# Patient Record
Sex: FEMALE | Race: WHITE | NOT HISPANIC OR LATINO | Employment: OTHER | URBAN - METROPOLITAN AREA
[De-identification: names, ages, dates, MRNs, and addresses within clinical notes are randomized per-mention and may not be internally consistent; named-entity substitution may affect disease eponyms.]

---

## 2017-05-04 ENCOUNTER — GENERIC CONVERSION - ENCOUNTER (OUTPATIENT)
Dept: OTHER | Facility: OTHER | Age: 62
End: 2017-05-04

## 2017-05-04 ENCOUNTER — LAB CONVERSION - ENCOUNTER (OUTPATIENT)
Dept: OTHER | Facility: OTHER | Age: 62
End: 2017-05-04

## 2017-05-04 LAB — HBA1C MFR BLD HPLC: 5.4 % OF TOTAL HGB

## 2017-10-24 ENCOUNTER — ALLSCRIPTS OFFICE VISIT (OUTPATIENT)
Dept: OTHER | Facility: OTHER | Age: 62
End: 2017-10-24

## 2017-10-24 DIAGNOSIS — Z12.31 ENCOUNTER FOR SCREENING MAMMOGRAM FOR MALIGNANT NEOPLASM OF BREAST: ICD-10-CM

## 2017-11-07 ENCOUNTER — ALLSCRIPTS OFFICE VISIT (OUTPATIENT)
Dept: OTHER | Facility: OTHER | Age: 62
End: 2017-11-07

## 2017-11-08 NOTE — PROGRESS NOTES
Assessment  1  Shingles outbreak (053 9) (B02 9)    Plan  Shingles outbreak    · Famciclovir 500 MG Oral Tablet; TAKE 1 TABLET 3 TIMES DAILY FOR 7 DAYS   · Gabapentin 100 MG Oral Capsule; take 1 caps  q HS, may increase dose to 2 caps  in 2-3 days if no side effects, then increase to 3 caps  q HS in 3 days for  symptoms control   · Apply cold compresses 4 times a day for 20 minutes to help relieve pain or itching ;  Status:Complete;   Done: [id number]   · You may continue or resume your normal level of activity ; Status:Complete;   Done:  65FHD8893   · Seek Immediate Medical Attention if: You have signs of infection in or around the affected  area  Watch for:; Status:Complete;   Done: 46NUO7178    Discussion/Summary    Presents with herpes zoster outbreak start Famciclovir 500mg one tab TID x 7 days take Gabapentin as ordered for pain- SE reviewed  site daily with gentle soap and water, avoid any lotions/ creams to sitecompresses prn tight fitting clothing in area with any redness, drainage, warmth, increased pain, F/Cshe call her insurance regarding the Zostavax vaccine coverage- she should still get this vaccine  Possible side effects of new medications were reviewed with the patient/guardian today  The treatment plan was reviewed with the patient/guardian  The patient/guardian understands and agrees with the treatment plan     Self Referrals: No      Chief Complaint  Patient is here for a possible bug bite on her right side of her buttocks  she has had if for over a week now and it has not improved      Advance Directives  Advance Directive [facility]:   NO - Patient does not have an advance health care directive  History of Present Illness  HPI: Pt presents with her daughter today for an acute visit     she may have been bitten by a bug on her right lateral buttock, isn't necessarily sure what it isnoticed one week ago some discomfort in area, then noticed a few spots that were redis now scabbedleg and left buttock painful- worse when lying on right sidenoticed any drainage, but isn't really checking the site regularly doesn't feel warm fevers or chills remove a bug from the site not had the Zostavax vaccine yet      Review of Systems    Constitutional: no fever,-- not feeling poorly,-- no chills-- and-- not feeling tired  Cardiovascular: no chest pain,-- no intermittent leg claudication,-- no palpitations-- and-- no lower extremity edema  Respiratory: no shortness of breath,-- no cough,-- no wheezing-- and-- no shortness of breath during exertion  Gastrointestinal: no abdominal pain,-- no nausea,-- no constipation-- and-- no diarrhea  Musculoskeletal: no myalgias  Integumentary: as noted in HPI  Neurological: no numbness-- and-- no tingling  ROS reviewed  Active Problems  1  Encounter for screening mammogram for malignant neoplasm of breast (V76 12)   (Z12 31)   2  Impaired fasting glucose (790 21) (R73 01)   3  Mixed hyperlipidemia (272 2) (E78 2)   4  Need for influenza vaccination (V04 81) (Z23)   5  Osteopenia (733 90) (M85 80)   6  Post-menopausal (V49 81) (Z78 0)   7  Screening for cervical cancer (V76 2) (Z12 4)   8  Screening for colorectal cancer (V76 51) (Z12 11,Z12 12)   9  Screening for hypothyroidism (V77 0) (Z13 29)   10  Screening for osteoporosis (V82 81) (Z13 820)   11  Well adult exam (V70 0) (Z00 00)    Past Medical History  1  History of hypertension (V12 59) (Z86 79)   2  History of Overweight (278 02) (E66 3)  Active Problems And Past Medical History Reviewed: The active problems and past medical history were reviewed and updated today  Family History  Mother    1  Family history of hyperlipidemia (V18 19) (Z83 49)   2  Family history of hypertension (V17 49) (Z82 49)  Father    3   Family history of diabetes mellitus (V18 0) (Z83 3)    Social History   · Daily caffeine consumption, 1 serving a day   · Denies alcohol consumption (V49 89) (Z78 9)   · Never a smoker  The social history was reviewed and updated today  The social history was reviewed and is unchanged  Current Meds   1  Vitamin D 1000 UNIT Oral Tablet; TAKE 1 TABLET DAILY; Therapy: 21CJZ1667 to (Evaluate:92Oar1802); Last Rx:11Dco2512 Ordered    The medication list was reviewed and updated today  Allergies  1  No Known Drug Allergies    Vitals   Recorded: 15YPI4941 09:00AM   Temperature 97 7 F   Heart Rate 64   Respiration 20   Systolic 848   Diastolic 80   Height 5 ft 3 5 in   Weight 147 lb    BMI Calculated 25 63   BSA Calculated 1 71     Physical Exam    Constitutional   General appearance: No acute distress, well appearing and well nourished  Eyes   Conjunctiva and lids: No swelling, erythema or discharge  Pupils and irises: Equal, round and reactive to light  Pulmonary   Respiratory effort: No increased work of breathing or signs of respiratory distress  Auscultation of lungs: Clear to auscultation  Cardiovascular   Auscultation of heart: Normal rate and rhythm, normal S1 and S2, without murmurs  Examination of extremities for edema and/or varicosities: Normal     Lymphatic   Palpation of lymph nodes in neck: No lymphadenopathy  Musculoskeletal   Gait and station: Normal     Skin   Skin and subcutaneous tissue: Abnormal  -- Erythematous vesicles, mostly crusted on right lower, lateral buttock  No warmth or signs of infection  +slightly tender to palpation  No drainage  Psychiatric   Orientation to person, place, and time: Normal     Mood and affect: Normal          Attending Note  Collaborating Physician Note: Collaborating Note: I did not interview and examine the patient-- and-- I agree with the Advanced Practitioner note  Future Appointments    Date/Time Provider Specialty Site   11/14/2018 09:00 AM YONAS Retana   Family Medicine [facility]     Signatures   Electronically signed by : MILO Mueller; Nov 7 2017  9:43AM EST (Author)    Electronically signed by :  Stew Harry MD; Nov 7 2017 11:32AM EST                       (Author)

## 2017-12-12 ENCOUNTER — GENERIC CONVERSION - ENCOUNTER (OUTPATIENT)
Dept: OTHER | Facility: OTHER | Age: 62
End: 2017-12-12

## 2017-12-12 LAB
BASOPHILS # BLD AUTO: 0.8 %
BASOPHILS # BLD AUTO: 42 CELLS/UL (ref 0–200)
CHOLEST SERPL-MCNC: 281 MG/DL
CHOLEST/HDLC SERPL: 2.7 (CALC)
DEPRECATED RDW RBC AUTO: 13.1 % (ref 11–15)
EOSINOPHIL # BLD AUTO: 249 CELLS/UL (ref 15–500)
EOSINOPHIL # BLD AUTO: 4.7 %
EST. AVERAGE GLUCOSE BLD GHB EST-MCNC: 105 (CALC)
EST. AVERAGE GLUCOSE BLD GHB EST-MCNC: 5.8 (CALC)
HBA1C MFR BLD HPLC: 5.3 % OF TOTAL HGB
HCT VFR BLD AUTO: 41.5 % (ref 35–45)
HDLC SERPL-MCNC: 103 MG/DL
HGB BLD-MCNC: 13.5 G/DL (ref 11.7–15.5)
LDL CHOLESTEROL (HISTORICAL): 154 MG/DL (CALC)
LYMPHOCYTES # BLD AUTO: 1648 CELLS/UL (ref 850–3900)
LYMPHOCYTES # BLD AUTO: 31.1 %
MCH RBC QN AUTO: 28.8 PG (ref 27–33)
MCHC RBC AUTO-ENTMCNC: 32.5 G/DL (ref 32–36)
MCV RBC AUTO: 88.5 FL (ref 80–100)
MONOCYTES # BLD AUTO: 360 CELLS/UL (ref 200–950)
MONOCYTES (HISTORICAL): 6.8 %
NEUTROPHILS # BLD AUTO: 3000 CELLS/UL (ref 1500–7800)
NEUTROPHILS # BLD AUTO: 56.6 %
NON-HDL-CHOL (CHOL-HDL) (HISTORICAL): 178 MG/DL (CALC)
PLATELET # BLD AUTO: 262 THOUSAND/UL (ref 140–400)
PMV BLD AUTO: 11.4 FL (ref 7.5–12.5)
RBC # BLD AUTO: 4.69 MILLION/UL (ref 3.8–5.1)
TRIGL SERPL-MCNC: 118 MG/DL
TSH SERPL DL<=0.05 MIU/L-ACNC: 1.83 MIU/L (ref 0.4–4.5)
WBC # BLD AUTO: 5.3 THOUSAND/UL (ref 3.8–10.8)

## 2018-01-13 VITALS
HEIGHT: 64 IN | RESPIRATION RATE: 20 BRPM | BODY MASS INDEX: 25.1 KG/M2 | WEIGHT: 147 LBS | DIASTOLIC BLOOD PRESSURE: 80 MMHG | HEART RATE: 64 BPM | TEMPERATURE: 97.7 F | SYSTOLIC BLOOD PRESSURE: 140 MMHG

## 2018-01-15 VITALS
RESPIRATION RATE: 16 BRPM | HEIGHT: 64 IN | SYSTOLIC BLOOD PRESSURE: 136 MMHG | BODY MASS INDEX: 25.51 KG/M2 | DIASTOLIC BLOOD PRESSURE: 70 MMHG | TEMPERATURE: 97.9 F | HEART RATE: 68 BPM | WEIGHT: 149.4 LBS

## 2018-01-15 NOTE — PROGRESS NOTES
Assessment    1  Mixed hyperlipidemia (272 2) (E78 2)   2  Impaired fasting glucose (790 21) (R73 01)   3  Osteopenia (733 90) (M85 80)   4  Need for influenza vaccination (V04 81) (Z23)   5  Well adult exam (V70 0) (Z00 00)    Plan  Encounter for screening mammogram for malignant neoplasm of breast    · * MAMMO SCREENING BILATERAL W CAD; Status:Hold For - Scheduling; Requested  for:2017;   Impaired fasting glucose    · (Q) HEMOGLOBIN A1c WITH eAG; Status:Active; Requested DOT:67NQH3358;   Impaired fasting glucose, Mixed hyperlipidemia, Osteopenia    · Follow-up visit in 1 year Evaluation and Treatment  Follow-up  Status: Hold For -  Scheduling  Requested for: 23OVS4812   · (1) LIPID PANEL, FASTING; Status:Active; Requested BOW:38NTR2208;    · (Q) TSH, 3RD GENERATION; Status:Active; Requested SJO:69CIQ8844;   Need for influenza vaccination    · Fluzone Quadrivalent Intramuscular Suspension  Osteopenia    · (Q) CBC (INCLUDES DIFF/PLT) (REFL); Status:Active; Requested YAM:64BUA0105;   Screening for colorectal cancer    · COLONOSCOPY; Status:Active; Requested [id number];     Discussion/Summary  health maintenance visit Currently, she eats a healthy diet and has an adequate exercise regimen  the risks and benefits of cervical cancer screening were discussed the patient declines cervical cancer screening Breast cancer screening: the risks and benefits of breast cancer screening were discussed, monthly self breast exam was advised and mammogram has been ordered  Colorectal cancer screening: the risks and benefits of colorectal cancer screening were discussed and colonoscopy has been ordered  Osteoporosis screening: the risks and benefits of osteoporosis screening were discussed and bone mineral density testing is current  Screening lab work includes hemoglobin, glucose, lipid profile and thyroid function testing  The risks and benefits of immunizations were discussed and Fluzone administered today   She was advised to be evaluated by an ophthalmologist  Advice and education were given regarding nutrition, aerobic exercise, weight bearing exercise, calcium supplements, vitamin D supplements, cardiovascular risk reduction, sunscreen use, self skin examination and seat belt use  Patient discussion: discussed with the patient, discussed with the patient's family  1 Well adult PE - recommended to schedule mammogram, colonoscopy  Fluzone administered today  Recommended Zostavax, Tdap vaccination  Patient's daughter will check with insurance regarding coverage  2 Hyperlipidemia /IFG -follow a low-cholesterol, low carb diet, regular exercise  Check CMP, lipid panel, TSH  3 Osteopenia -encouraged weightbearing exercise, sufficient dietary calcium intake  Continue Vit D 1000 IU daily  Recheck DEXA scan next year  Schedule follow-up office visit in 1 year or call sooner with any acute problems  Chief Complaint  Patient here for 1 year preventative physical exam       History of Present Illness  HM, Adult Female: The patient is being seen for a health maintenance evaluation  General Health: The patient's health since the last visit is described as good  She has regular dental visits  She complains of vision problems  Vision care includes wearing reading glasses  She denies hearing loss  Immunizations status: not up to date  Lifestyle:  She consumes a diverse and healthy diet  She does not have any weight concerns  She exercises regularly  She does not use tobacco  She denies alcohol use  She denies drug use  Reproductive health: the patient is postmenopausal    Screening: Cervical cancer screening includes uncertain timing of her last pap smear  Breast cancer screening includes a mammogram performed over 10 years ago  Colorectal cancer screening includes no previous colonoscopy  Metabolic screening includes lipid profile performed 5/16, glucose screening performed 5/17 and DEXA performed 5/16  HPI: Patient presents to the office for awell adult PE accompanied by her daughter  Patient was last seen by Sim Rider in November 2016  PMHx: Hyperlipidemia, IFG, osteopenia  Patient offers no complaints  She takes Vitamin D 1000 IU daily  Last blood test done in May 2017  Fasting blood sugar 89, Hemoglobin A1c 5 4  DEXA scan done in 5/16 showed osteopenia  Patient has not had mammogram for over 10 years  Never had colonoscopy  Review of Systems    Constitutional: no fever, no chills and not feeling tired  Weight has been stable  Eyes: no eye pain, no dryness of the eyes, eyes not red, no purulent discharge from the eyes and no itching of the eyes  No visual disturbances  ENT: no earache, no nosebleeds, no sore throat, no hearing loss, no nasal discharge and no hoarseness  Cardiovascular: no chest pain, no intermittent leg claudication, no palpitations and no lower extremity edema  Respiratory: no shortness of breath, no cough, no wheezing and no shortness of breath during exertion  Gastrointestinal: no abdominal pain, no nausea, no vomiting, no constipation, no diarrhea and no blood in stools  Genitourinary: no dysuria, no pelvic pain, no vaginal discharge, no incontinence and no unexplained vaginal bleeding  Musculoskeletal: no arthralgias, no joint swelling, no myalgias and no joint stiffness  Integumentary: no rashes, no itching, no skin lesions and no skin wound  Neurological: no headache, no numbness, no tingling, no dizziness, no fainting and no difficulty walking  Psychiatric: no anxiety, no sleep disturbances and no depression  Endocrine: no muscle weakness and no hot flashes  Hematologic/Lymphatic: No complaints of swollen glands, no swollen glands in the neck, does not bleed easily, does not bruise easily  Active Problems    1  Encounter for screening mammogram for malignant neoplasm of breast (V76 12)   (Z12 31)   2   Impaired fasting glucose (790 21) (R73 01)   3  Mixed hyperlipidemia (272 2) (E78 2)   4  Need for influenza vaccination (V04 81) (Z23)   5  Osteopenia (733 90) (M85 80)   6  Post-menopausal (V49 81) (Z78 0)   7  Screening for cervical cancer (V76 2) (Z12 4)   8  Screening for colorectal cancer (V76 51) (Z12 11,Z12 12)   9  Screening for hypothyroidism (V77 0) (Z13 29)   10  Screening for osteoporosis (V82 81) (Z13 820)    Past Medical History    · History of hypertension (V12 59) (Z86 79)   · History of Overweight (278 02) (E66 3)    Family History  Mother    · Family history of hyperlipidemia (V18 19) (Z83 49)   · Family history of hypertension (V17 49) (Z82 49)  Father    · Family history of diabetes mellitus (V18 0) (Z83 3)    Social History    · Daily caffeine consumption, 1 serving a day   · Denies alcohol consumption (V49 89) (Z78 9)   · Never a smoker    Current Meds   1  Vitamin D 1000 UNIT Oral Tablet; TAKE 1 TABLET DAILY; Therapy: 52UGP7918 to (Evaluate:73Bpw3430); Last Rx:[id number] Ordered    Allergies    1  No Known Drug Allergies    Vitals   Recorded: 67OQA5591 08:50AM Recorded: 17YFI3204 08:22AM   Temperature  97 9 F, Tympanic   Heart Rate  68, L Radial   Respiration  16   Systolic 119 419, LUE   Diastolic 70 60, LUE   Height  5 ft 3 5 in   Weight  149 lb 6 4 oz   BMI Calculated  26 05   BSA Calculated  1 72   Pain Scale  0     Physical Exam    Constitutional   General appearance: No acute distress, well appearing and well nourished  Head and Face   Head and face: Normal     Eyes   Conjunctiva and lids: No swelling, erythema or discharge  Pupils and irises: Equal, round, reactive to light  Ears, Nose, Mouth, and Throat   External inspection of ears and nose: Normal     Otoscopic examination: Tympanic membranes translucent with normal light reflex  Canals patent without erythema  Hearing: Normal     Nasal mucosa, septum, and turbinates: Normal without edema or erythema      Lips, teeth, and gums: Normal, good dentition  Oropharynx: Normal with no erythema, edema, exudate or lesions  Neck   Neck: Supple, symmetric, trachea midline, no masses  Pulmonary   Respiratory effort: No increased work of breathing or signs of respiratory distress  Auscultation of lungs: Clear to auscultation  Cardiovascular   Auscultation of heart: Normal rate and rhythm, normal S1 and S2, no murmurs  Carotid pulses: 2+ bilaterally  no carotid bruits  Abdominal aorta: Normal   no abdominal bruits  Examination of extremities for edema and/or varicosities: Normal     Abdomen   Abdomen: Non-tender, no masses  Liver and spleen: No hepatomegaly or splenomegaly  Lymphatic   Palpation of lymph nodes in neck: No lymphadenopathy  Musculoskeletal   Gait and station: Normal     Digits and nails: Normal without clubbing or cyanosis  Joints, bones, and muscles: Normal     Range of motion: Normal     Skin   Skin and subcutaneous tissue: Normal without rashes or lesions  Neurologic   Cranial nerves: Cranial nerves II-XII intact  Sensation: No sensory loss  Psychiatric   Judgment and insight: Normal     Recent and remote memory: Intact      Mood and affect: Normal        Results/Data  (1) CBC/PLT/DIFF [id number] 07:36AM Lila Boor     Test Name Result Flag Reference   WHITE BLOOD CELL COUNT 4 6 Thousand/uL  3 8-10 8   RED BLOOD CELL COUNT 4 61 Million/uL  3 80-5 10   HEMOGLOBIN 13 2 g/dL  11 7-15 5   HEMATOCRIT 40 4 %  35 0-45 0   MCV 87 7 fL  80 0-100 0   MCH 28 6 pg  27 0-33 0   MCHC 32 6 g/dL  32 0-36 0   RDW 13 7 %  11 0-15 0   PLATELET COUNT 375 Thousand/uL  140-400   ABSOLUTE NEUTROPHILS 2548 cells/uL  9241-0763   ABSOLUTE LYMPHOCYTES 1518 cells/uL  850-3900   ABSOLUTE MONOCYTES 248 cells/uL  200-950   ABSOLUTE EOSINOPHILS 281 cells/uL     ABSOLUTE BASOPHILS 5 cells/uL  0-200   NEUTROPHILS 55 4 %     LYMPHOCYTES 33 0 %     MONOCYTES 5 4 %     EOSINOPHILS 6 1 %     BASOPHILS 0 1 %     MPV 9 4 fL 7  5-12 5     (1) COMPREHENSIVE METABOLIC PANEL [id number] 86:90RL Muñoz Poag     Test Name Result Flag Reference   GLUCOSE 89 mg/dL  65-99   Fasting reference interval   UREA NITROGEN (BUN) 12 mg/dL  7-25   CREATININE 0 76 mg/dL  0 50-0 99   For patients >52years of age, the reference limit  for Creatinine is approximately 13% higher for people  identified as -American  eGFR NON-AFR  AMERICAN 83 mL/min/1 73m2  > OR = 60   eGFR AFRICAN AMERICAN 97 mL/min/1 73m2  > OR = 60   BUN/CREATININE RATIO   7-38   NOT APPLICABLE (calc)   SODIUM 140 mmol/L  135-146   POTASSIUM 4 2 mmol/L  3 5-5 3   CHLORIDE 102 mmol/L     CARBON DIOXIDE 29 mmol/L  20-31   CALCIUM 9 4 mg/dL  8 6-10 4   PROTEIN, TOTAL 6 9 g/dL  6 1-8 1   ALBUMIN 4 1 g/dL  3 6-5 1   GLOBULIN 2 8 g/dL (calc)  1 9-3 7   ALBUMIN/GLOBULIN RATIO 1 5 (calc)  1 0-2 5   BILIRUBIN, TOTAL 0 5 mg/dL  0 2-1 2   ALKALINE PHOSPHATASE 54 U/L     AST 13 U/L  10-35   ALT 9 U/L  6-29     (Q) HEMOGLOBIN A1c [id number] 07:36AM Sally Ferguson   REPORT COMMENT:  FASTING:YES     Test Name Result Flag Reference   HEMOGLOBIN A1c 5 4 % of total Hgb  <5 7   For the purpose of screening for the presence of  diabetes:     <5 7%       Consistent with the absence of diabetes  5 7-6 4%    Consistent with increased risk for diabetes              (prediabetes)  > or =6 5%  Consistent with diabetes     This assay result is consistent with a decreased risk  of diabetes  Currently, no consensus exists regarding use of  hemoglobin A1c for diagnosis of diabetes in children  According to American Diabetes Association (ADA)  guidelines, hemoglobin A1c <7 0% represents optimal  control in non-pregnant diabetic patients  Different  metrics may apply to specific patient populations  Standards of Medical Care in Diabetes(ADA)  Health Management  Screening for cervical cancer   (1) THIN PREP PAP WITH IMAGING; every 3 years;  Next Due: 85BZG7518; Overdue    Signatures   Electronically signed by : YONAS Lara ; Oct 24 2017  8:59AM EST                       (Author)

## 2018-01-16 NOTE — RESULT NOTES
Message   Please let the patient know I reviewed her blood work results  Her fasting glucose was normal at 90  This is better than her previous fasting glucose which was at 100  Her A1C was at 5 7% which was borderline for pre-diabetes  She should follow a low carb diet, try to exercise 30 minutes 5x per week  Her electrolytes, kidney function and liver enzymes were WNL  I will order blood work for her to get done about 1-2 weeks prior to her next appointmnet in this upcoming May  These labs will need to be fasting  Please mail this to her  Thank you  Verified Results  (1) CBC/PLT/DIFF [id number] 08:22AM Eric Wing     Test Name Result Flag Reference   WHITE BLOOD CELL COUNT 5 3 Thousand/uL  3 8-10 8   RED BLOOD CELL COUNT 4 81 Million/uL  3 80-5 10   HEMOGLOBIN 14 0 g/dL  11 7-15 5   HEMATOCRIT 42 1 %  35 0-45 0   MCV 87 6 fL  80 0-100 0   MCH 29 1 pg  27 0-33 0   MCHC 33 2 g/dL  32 0-36 0   RDW 13 8 %  11 0-15 0   PLATELET COUNT 345 Thousand/uL  140-400   MPV 9 7 fL  7 5-11 5   ABSOLUTE NEUTROPHILS 3201 cells/uL  2496-3002   ABSOLUTE LYMPHOCYTES 1505 cells/uL  850-3900   ABSOLUTE MONOCYTES 339 cells/uL  200-950   ABSOLUTE EOSINOPHILS 244 cells/uL     ABSOLUTE BASOPHILS 11 cells/uL  0-200   NEUTROPHILS 60 4 %     LYMPHOCYTES 28 4 %     MONOCYTES 6 4 %     EOSINOPHILS 4 6 %     BASOPHILS 0 2 %       (1) COMPREHENSIVE METABOLIC PANEL [id number] 25:29NH Eric Wing     Test Name Result Flag Reference   GLUCOSE 90 mg/dL  65-99   Fasting reference interval   UREA NITROGEN (BUN) 14 mg/dL  7-25   CREATININE 0 78 mg/dL  0 50-0 99   For patients >52years of age, the reference limit  for Creatinine is approximately 13% higher for people  identified as -American  eGFR NON-AFR   AMERICAN 81 mL/min/1 73m2  > OR = 60   eGFR AFRICAN AMERICAN 94 mL/min/1 73m2  > OR = 60   BUN/CREATININE RATIO   2-09   NOT APPLICABLE (calc)   SODIUM 138 mmol/L  135-146 POTASSIUM 4 3 mmol/L  3 5-5 3   CHLORIDE 101 mmol/L     CARBON DIOXIDE 28 mmol/L  20-31   CALCIUM 9 6 mg/dL  8 6-10 4   PROTEIN, TOTAL 7 1 g/dL  6 1-8 1   ALBUMIN 4 4 g/dL  3 6-5 1   GLOBULIN 2 7 g/dL (calc)  1 9-3 7   ALBUMIN/GLOBULIN RATIO 1 6 (calc)  1 0-2 5   BILIRUBIN, TOTAL 0 7 mg/dL  0 2-1 2   ALKALINE PHOSPHATASE 54 U/L     AST 13 U/L  10-35   ALT 13 U/L  6-29     (Q) HEMOGLOBIN A1c [id number] 08:22AM Ghada Loco   REPORT COMMENT:  FASTING:YES     Test Name Result Flag Reference   HEMOGLOBIN A1c 5 7 % of total Hgb H <5 7   According to ADA guidelines, hemoglobin A1c <7 0%  represents optimal control in non-pregnant diabetic  patients  Different metrics may apply to specific  patient populations  Standards of Medical Care in    Diabetes Care  2013;36:s11-s66     For the purpose of screening for the presence of  diabetes  <5 7%       Consistent with the absence of diabetes  5 7-6 4%    Consistent with increased risk for diabetes              (prediabetes)  >or=6 5%    Consistent with diabetes     This assay result is consistent with an increased risk  of diabetes  Currently, no consensus exists for use of hemoglobin  A1c for diagnosis of diabetes for children  Plan  Impaired fasting glucose    · (1) HEMOGLOBIN A1C; Status:Hold For - Exact Date; Requested for:Approx [id number];   Impaired fasting glucose, Mixed hyperlipidemia, Osteopenia, Overweight    · (1) CBC/PLT/DIFF; Status:Hold For - Exact Date; Requested for:Approx [id number];   Impaired fasting glucose, Mixed hyperlipidemia, Osteopenia, Overweight,  Post-menopausal    · (1) COMPREHENSIVE METABOLIC PANEL; Status:Hold For - Exact Date; Requested  for:Approx [id number];   Mixed hyperlipidemia    · (1) TSH WITH FT4 REFLEX; Status:Hold For - Exact Date; Requested for:Approx  [id number];   Mixed hyperlipidemia, Overweight    · (1) LIPID PANEL FASTING W DIRECT LDL REFLEX; Status:Hold For - Exact Date;   Requested for:Approx [id number];

## 2018-01-18 NOTE — RESULT NOTES
Message   Please let the patient know I reviewed her lab work results  TSH (thyroid) WNL  Blood counts (hemoglobin, WBC, platelets, etc ) WNL  Electrolytes, kidney function and liver function WNL  Fasting glucose was very slighlty elevated at 100- should be <100  Try to follow a low carb diet  Total cholesterol slightly elevated at 239 (should be <200)  HDL (good cholesterol) was great at 95  Triglycerides good at 130  LDL (bad cholesterol) at 118  Try to follow a low fat/ low cholesterol diet and exercise 30 minutes 5 x per week  Thank you  Verified Results  (1) CBC/PLT/DIFF [id number] 07:37AM [facility]     Test Name Result Flag Reference   WHITE BLOOD CELL COUNT 5 3 Thousand/uL  3 8-10 8   RED BLOOD CELL COUNT 4 75 Million/uL  3 80-5 10   HEMOGLOBIN 13 6 g/dL  11 7-15 5   HEMATOCRIT 41 8 %  35 0-45 0   MCV 87 9 fL  80 0-100 0   MCH 28 6 pg  27 0-33 0   MCHC 32 5 g/dL  32 0-36 0   RDW 13 8 %  11 0-15 0   PLATELET COUNT 656 Thousand/uL  140-400   MPV 9 9 fL  7 5-11 5   ABSOLUTE NEUTROPHILS 3185 cells/uL  9477-1171   ABSOLUTE LYMPHOCYTES 1542 cells/uL  850-3900   ABSOLUTE MONOCYTES 313 cells/uL  200-950   ABSOLUTE EOSINOPHILS 244 cells/uL     ABSOLUTE BASOPHILS 16 cells/uL  0-200   NEUTROPHILS 60 1 %     LYMPHOCYTES 29 1 %     MONOCYTES 5 9 %     EOSINOPHILS 4 6 %     BASOPHILS 0 3 %       (1) COMPREHENSIVE METABOLIC PANEL [id number] 89:65UI [facility]     Test Name Result Flag Reference   GLUCOSE 100 mg/dL H 65-99   Fasting reference interval   UREA NITROGEN (BUN) 15 mg/dL  7-25   CREATININE 0 71 mg/dL  0 50-0 99   For patients >52years of age, the reference limit  for Creatinine is approximately 13% higher for people  identified as -American  eGFR NON-AFR   AMERICAN 91 mL/min/1 73m2  > OR = 60   eGFR AFRICAN AMERICAN 106 mL/min/1 73m2  > OR = 60   BUN/CREATININE RATIO   0-49   NOT APPLICABLE (calc)   SODIUM 139 mmol/L  135-146   POTASSIUM 4 3 mmol/L  3 5-5 3   CHLORIDE 103 mmol/L     CARBON DIOXIDE 27 mmol/L  19-30   CALCIUM 9 3 mg/dL  8 6-10 4   PROTEIN, TOTAL 7 1 g/dL  6 1-8 1   ALBUMIN 4 5 g/dL  3 6-5 1   GLOBULIN 2 6 g/dL (calc)  1 9-3 7   ALBUMIN/GLOBULIN RATIO 1 7 (calc)  1 0-2 5   BILIRUBIN, TOTAL 0 6 mg/dL  0 2-1 2   ALKALINE PHOSPHATASE 54 U/L     AST 13 U/L  10-35   ALT 10 U/L  6-29     (Q) LIPID PANEL WITH REFLEX TO DIRECT LDL [id number] 07:37AM Dydra Cost     Test Name Result Flag Reference   CHOLESTEROL, TOTAL 239 mg/dL H 125-200   HDL CHOLESTEROL 95 mg/dL  > OR = 46   TRIGLICERIDES 042 mg/dL  <150   LDL-CHOLESTEROL 118 mg/dL (calc)  <130   Desirable range <100 mg/dL for patients with CHD or  diabetes and <70 mg/dL for diabetic patients with  known heart disease  CHOL/HDLC RATIO 2 5 (calc)  < OR = 5 0   NON HDL CHOLESTEROL 144 mg/dL (calc)     Target for non-HDL cholesterol is 30 mg/dL higher than   LDL cholesterol target       (Q) TSH, 3RD GENERATION W/REFLEX TO FT4 [id number] 07:37AM [facility] Cost   REPORT COMMENT:  FASTING:YES     Test Name Result Flag Reference   TSH W/REFLEX TO FT4 1 47 mIU/L  0 40-4 50

## 2018-01-18 NOTE — RESULT NOTES
Message   Dexa showed low bone density  Pt should take calcium 1200mg daily , vitD 1000IU daily and walk everyday  Verified Results  * DXA BONE DENSITY SPINE HIP AND PELVIS [id number] 10:54AM [facility] Number: [id number]     Test Name Result Flag Reference   DXA BONE DENSITY SPINE HIP AND PELVIS (Report)     CENTRAL DXA SCAN     CLINICAL HISTORY:  56year old post-menopausal  female risk factors include postmenopausal, estrogen deficiency  TECHNIQUE: Bone densitometry was performed using a [employer] [place] C bone densitometer  Regions of interest appear properly placed  There are no obvious fractures or other confounding variables which could limit the study  L1 is excluded from evaluation   due to the presence of degenerative or osteoarthritic changes  COMPARISON: Baseline     RESULTS:    LUMBAR SPINE: L2-L4:   BMD 0 865 gm/cm2   T-score below normal, -1 9   Z-score -0 3     LEFT TOTAL HIP:   BMD 0 904 gm/cm2   T-score normal, -0 3   Z-score 0 8     LEFT FEMORAL NECK:   BMD 0 798 gm/cm2   T-score normal, -0 5   Z-score +1 0     The Frax score in this patient identifies the risk of a major osteoporotic fracture in the next 10 years at 7 0% and a hip fracture risk of 0 3%, below treatment thresholds     ASSESSMENT:   1  Based on the WHO classification, this study identifies moderate spine osteopenia and the patient is considered at current low risk for fracture  2  A daily intake of calcium of at least 1200 mg and vitamin D, 800-1000 IU, as well as weight bearing and muscle strengthening exercise, fall prevention and avoidance of tobacco and excessive alcohol intake as basic preventive measures are recommended  3  Repeat DXA scan in 24-36 months as clinically indicated        WHO CLASSIFICATION:   Normal (a T-score of -1 0 or higher)   Low bone mineral density (a T-score of less than -1 0 but higher than -2 5)   Osteoporosis (a T-score of -2 5 or less)   Severe osteoporosis (a T-score of -2 5 or less with a fragility fracture)     Thank you for allowing us the opportunity to participate in your patient care  The expanded DEXA report will no longer be arriving in your mail  If you desire to view the full report please contact [address] [place] [address] or access the PACS system          Workstation performed: [id number]

## 2018-01-18 NOTE — RESULT NOTES
Verified Results  (1) CBC/PLT/DIFF [id number] 07:36AM Bernarda Tolentino     Test Name Result Flag Reference   WHITE BLOOD CELL COUNT 4 6 Thousand/uL  3 8-10 8   RED BLOOD CELL COUNT 4 61 Million/uL  3 80-5 10   HEMOGLOBIN 13 2 g/dL  11 7-15 5   HEMATOCRIT 40 4 %  35 0-45 0   MCV 87 7 fL  80 0-100 0   MCH 28 6 pg  27 0-33 0   MCHC 32 6 g/dL  32 0-36 0   RDW 13 7 %  11 0-15 0   PLATELET COUNT 793 Thousand/uL  140-400   ABSOLUTE NEUTROPHILS 2548 cells/uL  5149-3640   ABSOLUTE LYMPHOCYTES 1518 cells/uL  850-3900   ABSOLUTE MONOCYTES 248 cells/uL  200-950   ABSOLUTE EOSINOPHILS 281 cells/uL     ABSOLUTE BASOPHILS 5 cells/uL  0-200   NEUTROPHILS 55 4 %     LYMPHOCYTES 33 0 %     MONOCYTES 5 4 %     EOSINOPHILS 6 1 %     BASOPHILS 0 1 %     MPV 9 4 fL  7 5-12 5     (1) COMPREHENSIVE METABOLIC PANEL [id number] 96:84GX Bernarda Tolentino     Test Name Result Flag Reference   GLUCOSE 89 mg/dL  65-99   Fasting reference interval   UREA NITROGEN (BUN) 12 mg/dL  7-25   CREATININE 0 76 mg/dL  0 50-0 99   For patients >52years of age, the reference limit  for Creatinine is approximately 13% higher for people  identified as -American  eGFR NON-AFR   AMERICAN 83 mL/min/1 73m2  > OR = 60   eGFR AFRICAN AMERICAN 97 mL/min/1 73m2  > OR = 60   BUN/CREATININE RATIO   9-07   NOT APPLICABLE (calc)   SODIUM 140 mmol/L  135-146   POTASSIUM 4 2 mmol/L  3 5-5 3   CHLORIDE 102 mmol/L     CARBON DIOXIDE 29 mmol/L  20-31   CALCIUM 9 4 mg/dL  8 6-10 4   PROTEIN, TOTAL 6 9 g/dL  6 1-8 1   ALBUMIN 4 1 g/dL  3 6-5 1   GLOBULIN 2 8 g/dL (calc)  1 9-3 7   ALBUMIN/GLOBULIN RATIO 1 5 (calc)  1 0-2 5   BILIRUBIN, TOTAL 0 5 mg/dL  0 2-1 2   ALKALINE PHOSPHATASE 54 U/L     AST 13 U/L  10-35   ALT 9 U/L  6-29     (Q) HEMOGLOBIN A1c [id number] 07:36AM Sally Ferguson   REPORT COMMENT:  FASTING:YES     Test Name Result Flag Reference   HEMOGLOBIN A1c 5 4 % of total Hgb  <5 7   For the purpose of screening for the presence of  diabetes:     <5 7%       Consistent with the absence of diabetes  5 7-6 4%    Consistent with increased risk for diabetes              (prediabetes)  > or =6 5%  Consistent with diabetes     This assay result is consistent with a decreased risk  of diabetes  Currently, no consensus exists regarding use of  hemoglobin A1c for diagnosis of diabetes in children  According to American Diabetes Association (ADA)  guidelines, hemoglobin A1c <7 0% represents optimal  control in non-pregnant diabetic patients  Different  metrics may apply to specific patient populations  Standards of Medical Care in Diabetes(ADA)

## 2018-01-23 NOTE — RESULT NOTES
Verified Results  (Q) CBC (INCLUDES DIFF/PLT) (REFL) [id number] 08:49AM Whitney Ya     Test Name Result Flag Reference   WHITE BLOOD CELL COUNT 5 3 Thousand/uL  3 8-10 8   RED BLOOD CELL COUNT 4 69 Million/uL  3 80-5 10   HEMOGLOBIN 13 5 g/dL  11 7-15 5   HEMATOCRIT 41 5 %  35 0-45 0   MCV 88 5 fL  80 0-100 0   MCH 28 8 pg  27 0-33 0   MCHC 32 5 g/dL  32 0-36 0   RDW 13 1 %  11 0-15 0   PLATELET COUNT 865 Thousand/uL  140-400   MPV 11 4 fL  7 5-12 5   ABSOLUTE NEUTROPHILS 3000 cells/uL  9519-1219   ABSOLUTE LYMPHOCYTES 1648 cells/uL  850-3900   ABSOLUTE MONOCYTES 360 cells/uL  200-950   ABSOLUTE EOSINOPHILS 249 cells/uL     ABSOLUTE BASOPHILS 42 cells/uL  0-200   NEUTROPHILS 56 6 %     LYMPHOCYTES 31 1 %     MONOCYTES 6 8 %     EOSINOPHILS 4 7 %     BASOPHILS 0 8 %       (Q) TSH, 3RD GENERATION [id number] 08:49AM Whitney Ya     Test Name Result Flag Reference   TSH 1 83 mIU/L  0 40-4 50     (1) LIPID PANEL, FASTING [id number] 08:49AM Whitney Ya     Test Name Result Flag Reference   CHOLESTEROL, TOTAL 281 mg/dL H <200   HDL CHOLESTEROL 103 mg/dL  >71   TRIGLICERIDES 646 mg/dL  <150   LDL-CHOLESTEROL 154 mg/dL (calc) H    Reference range: <100     Desirable range <100 mg/dL for patients with CHD or  diabetes and <70 mg/dL for diabetic patients with  known heart disease  LDL-C is now calculated using the Bong-Green   calculation, which is a validated novel method providing   better accuracy than the Friedewald equation in the   estimation of LDL-C  Violetta Levin  8016;305(31): 3898-3861   (http://[employer]/faq/WOX222)   CHOL/HDLC RATIO 2 7 (calc)  <5 0   NON HDL CHOLESTEROL 178 mg/dL (calc) H <130   For patients with diabetes plus 1 major ASCVD risk   factor, treating to a non-HDL-C goal of <100 mg/dL   (LDL-C of <70 mg/dL) is considered a therapeutic   option       (Q) HEMOGLOBIN A1c WITH eAG [id number] 08:49AM Whitney Ya   REPORT COMMENT:  FASTING:YES     Test Name Result Flag Reference   HEMOGLOBIN A1c 5 3 % of total Hgb  <5 7   For the purpose of screening for the presence of  diabetes:     <5 7%       Consistent with the absence of diabetes  5 7-6 4%    Consistent with increased risk for diabetes              (prediabetes)  > or =6 5%  Consistent with diabetes     This assay result is consistent with a decreased risk  of diabetes  Currently, no consensus exists regarding use of  hemoglobin A1c for diagnosis of diabetes in children  According to American Diabetes Association (ADA)  guidelines, hemoglobin A1c <7 0% represents optimal  control in non-pregnant diabetic patients  Different  metrics may apply to specific patient populations  Standards of Medical Care in Diabetes(ADA)  eAG (mg/dL) 105 (calc)     eAG (mmol/L) 5 8 (calc)         Plan  Impaired fasting glucose, Mixed hyperlipidemia    · (1) LIPID PANEL, FASTING; Status:Active; Requested [id number];    · (Q) COMPREHENSIVE METABOLIC PNL W/ADJUSTED CALCIUM; Status:Active;   Requested [id number];

## 2018-11-12 RX ORDER — GABAPENTIN 100 MG/1
CAPSULE ORAL
COMMUNITY
Start: 2017-11-07 | End: 2018-11-14 | Stop reason: ALTCHOICE

## 2018-11-12 RX ORDER — FAMCICLOVIR 500 MG/1
1 TABLET, FILM COATED ORAL 3 TIMES DAILY
COMMUNITY
Start: 2017-11-07 | End: 2018-11-14 | Stop reason: ALTCHOICE

## 2018-11-14 ENCOUNTER — OFFICE VISIT (OUTPATIENT)
Dept: FAMILY MEDICINE CLINIC | Facility: CLINIC | Age: 63
End: 2018-11-14
Payer: MEDICARE

## 2018-11-14 VITALS
TEMPERATURE: 97.6 F | HEART RATE: 56 BPM | WEIGHT: 151.4 LBS | OXYGEN SATURATION: 97 % | BODY MASS INDEX: 25.85 KG/M2 | DIASTOLIC BLOOD PRESSURE: 80 MMHG | SYSTOLIC BLOOD PRESSURE: 150 MMHG | HEIGHT: 64 IN | RESPIRATION RATE: 16 BRPM

## 2018-11-14 DIAGNOSIS — Z23 NEED FOR INFLUENZA VACCINATION: ICD-10-CM

## 2018-11-14 DIAGNOSIS — R73.01 IMPAIRED FASTING GLUCOSE: ICD-10-CM

## 2018-11-14 DIAGNOSIS — R03.0 ELEVATED BLOOD PRESSURE READING: ICD-10-CM

## 2018-11-14 DIAGNOSIS — Z12.39 SCREENING FOR BREAST CANCER: ICD-10-CM

## 2018-11-14 DIAGNOSIS — M85.88 OSTEOPENIA OF SPINE: ICD-10-CM

## 2018-11-14 DIAGNOSIS — Z12.11 SCREENING FOR COLON CANCER: ICD-10-CM

## 2018-11-14 DIAGNOSIS — E78.2 MIXED HYPERLIPIDEMIA: Primary | ICD-10-CM

## 2018-11-14 DIAGNOSIS — Z13.820 SCREENING FOR OSTEOPOROSIS: ICD-10-CM

## 2018-11-14 DIAGNOSIS — Z23 NEED FOR PNEUMOCOCCAL VACCINATION: ICD-10-CM

## 2018-11-14 PROCEDURE — G0008 ADMIN INFLUENZA VIRUS VAC: HCPCS

## 2018-11-14 PROCEDURE — 90670 PCV13 VACCINE IM: CPT

## 2018-11-14 PROCEDURE — 90662 IIV NO PRSV INCREASED AG IM: CPT

## 2018-11-14 PROCEDURE — G0009 ADMIN PNEUMOCOCCAL VACCINE: HCPCS

## 2018-11-14 PROCEDURE — 99214 OFFICE O/P EST MOD 30 MIN: CPT | Performed by: FAMILY MEDICINE

## 2018-11-14 NOTE — PROGRESS NOTES
Chief Complaint   Patient presents with    Follow-up     1 year follow up     Health Maintenance   Topic Date Due    Hepatitis C Screening  1955    CRC Screening: Colonoscopy  1955    DTaP,Tdap,and Td Vaccines (1 - Tdap) 06/10/1974    Urinary Incontinence Screening  06/10/2018    Pneumococcal PPSV23/PCV13 65+ Years / Low and Medium Risk (1 of 2 - PCV13) 06/10/2018    INFLUENZA VACCINE  07/01/2018    Fall Risk  11/14/2019    Depression Screening PHQ  11/14/2019     Assessment/Plan:    Mixed hyperlipidemia  Recommended to follow a low-cholesterol, low-fat diet, regular exercise  Check CMP, lipid panel  Impaired fasting glucose  Avoid concentrated sweets  Osteopenia  Recommended weight-bearing exercise, keep sufficient dietary calcium intake  Take Vit D 1000 IU units daily  Elevated blood pressure reading  Recommended patient to follow a low-sodium diet  Check blood pressure at home and call with blood pressure log in 2-3 weeks  HM: Fluzone high dose and Prevnar 13 done today  Recommended Shingrix vaccination  Patient will check with insurance regarding coverage  Schedule screening mammogram, DEXA scan, colonoscopy  Schedule follow-up office visit in 6 months  Diagnoses and all orders for this visit:    Mixed hyperlipidemia  -     Comprehensive metabolic panel; Future  -     TSH, 3rd generation with Free T4 reflex; Future  -     Lipid panel; Future    Impaired fasting glucose    Osteopenia of spine  -     CBC and differential; Future  -     Comprehensive metabolic panel; Future    Need for influenza vaccination  -     influenza vaccine, 7527-3016, high-dose, PF 0 5 mL, for patients 65 yr+ (FLUZONE HIGH-DOSE)    Screening for breast cancer  -     Mammo screening bilateral w cad; Future    Screening for colon cancer  -     Ambulatory referral to Colorectal Surgery; Future    Screening for osteoporosis  -     DXA bone density spine hip and pelvis;  Future    Need for pneumococcal vaccination  -     PNEUMOCOCCAL CONJUGATE VACCINE 13-VALENT GREATER THAN 6 MONTHS    Elevated blood pressure reading          Subjective:      Patient ID: Martin Jeter is a 70 y o  female  HPI     Patient is 42-year-old female with Hyperlipidemia, impaired fasting glucose, osteopenia  She presents for annual follow-up office visit accompanied by her daughter  Patient states that she feels well  Denies chest pain, shortness of breath, dizziness  Patient walks daily  No balance problems  No falls  Patient did not schedule mammogram and colonoscopy as recommended at the last visit  She had DEXA scan done in May 2016 which showed osteopenia  Patient takes Vit D 1000 IU daily  Last blood test done in December 2017  Denies tobacco use  The following portions of the patient's history were reviewed and updated as appropriate: allergies, past family history, past medical history, past social history, past surgical history and problem list     Review of Systems   Constitutional: Negative for activity change, appetite change, chills, fatigue and fever  HENT: Negative for congestion, dental problem, ear pain, hearing loss, nosebleeds, sore throat, tinnitus and trouble swallowing  Eyes: Negative for pain, discharge, redness, itching and visual disturbance  Respiratory: Negative for cough, chest tightness, shortness of breath and wheezing  Cardiovascular: Negative for chest pain, palpitations and leg swelling  Gastrointestinal: Negative for abdominal pain, blood in stool, constipation, diarrhea, nausea and vomiting  Genitourinary: Negative for difficulty urinating, dysuria, flank pain, frequency and hematuria  No urinary incontinence  Musculoskeletal: Negative for arthralgias, back pain, gait problem, joint swelling, myalgias and neck pain  Skin: Negative for rash and wound  Neurological: Negative for dizziness, syncope, numbness and headaches  Hematological: Negative  Psychiatric/Behavioral: Negative  Objective:      /80 (BP Location: Left arm, Patient Position: Sitting, Cuff Size: Adult)   Pulse 56   Temp 97 6 °F (36 4 °C) (Oral)   Resp 16   Ht 5' 3 5" (1 613 m)   Wt 68 7 kg (151 lb 6 4 oz)   SpO2 97%   BMI 26 40 kg/m²        Physical Exam   Constitutional: She appears well-developed and well-nourished  HENT:   Head: Normocephalic and atraumatic  Right Ear: External ear normal    Left Ear: External ear normal    Mouth/Throat: Oropharynx is clear and moist    Eyes: Pupils are equal, round, and reactive to light  Conjunctivae are normal    Neck: Normal range of motion  Neck supple  No JVD present  Cardiovascular: Normal rate, regular rhythm and normal heart sounds  No murmur heard  No carotid bruits BL  No BL LE edema   Pulmonary/Chest: Effort normal  She has no wheezes  She has no rales  Abdominal: Soft  Bowel sounds are normal  There is no tenderness  Musculoskeletal: Normal range of motion  She exhibits no edema, tenderness or deformity  Skin: Skin is warm and dry  No rash noted  Psychiatric: She has a normal mood and affect  Vitals reviewed

## 2018-11-14 NOTE — ASSESSMENT & PLAN NOTE
Recommended weight-bearing exercise, keep sufficient dietary calcium intake  Take Vit D 1000 IU units daily

## 2018-11-14 NOTE — ASSESSMENT & PLAN NOTE
Recommended patient to follow a low-sodium diet  Check blood pressure at home and call with blood pressure log in 2-3 weeks

## 2019-05-12 PROBLEM — Z00.00 MEDICARE ANNUAL WELLNESS VISIT, INITIAL: Status: ACTIVE | Noted: 2019-05-12

## 2019-05-12 LAB
ALBUMIN SERPL-MCNC: 4.2 G/DL (ref 3.6–5.1)
ALBUMIN/GLOB SERPL: 1.6 (CALC) (ref 1–2.5)
ALP SERPL-CCNC: 54 U/L (ref 33–130)
ALT SERPL-CCNC: 10 U/L (ref 6–29)
AST SERPL-CCNC: 14 U/L (ref 10–35)
BASOPHILS # BLD AUTO: 31 CELLS/UL (ref 0–200)
BASOPHILS NFR BLD AUTO: 0.6 %
BILIRUB SERPL-MCNC: 0.6 MG/DL (ref 0.2–1.2)
BUN SERPL-MCNC: 12 MG/DL (ref 7–25)
BUN/CREAT SERPL: NORMAL (CALC) (ref 6–22)
CALCIUM SERPL-MCNC: 9.2 MG/DL (ref 8.6–10.4)
CHLORIDE SERPL-SCNC: 102 MMOL/L (ref 98–110)
CHOLEST SERPL-MCNC: 260 MG/DL
CHOLEST/HDLC SERPL: 3 (CALC)
CO2 SERPL-SCNC: 28 MMOL/L (ref 20–32)
CREAT SERPL-MCNC: 0.77 MG/DL (ref 0.5–0.99)
EOSINOPHIL # BLD AUTO: 301 CELLS/UL (ref 15–500)
EOSINOPHIL NFR BLD AUTO: 5.9 %
ERYTHROCYTE [DISTWIDTH] IN BLOOD BY AUTOMATED COUNT: 13.1 % (ref 11–15)
GLOBULIN SER CALC-MCNC: 2.6 G/DL (CALC) (ref 1.9–3.7)
GLUCOSE SERPL-MCNC: 92 MG/DL (ref 65–99)
HCT VFR BLD AUTO: 41 % (ref 35–45)
HDLC SERPL-MCNC: 87 MG/DL
HGB BLD-MCNC: 13.6 G/DL (ref 11.7–15.5)
LDLC SERPL CALC-MCNC: 144 MG/DL (CALC)
LYMPHOCYTES # BLD AUTO: 1642 CELLS/UL (ref 850–3900)
LYMPHOCYTES NFR BLD AUTO: 32.2 %
MCH RBC QN AUTO: 28.9 PG (ref 27–33)
MCHC RBC AUTO-ENTMCNC: 33.2 G/DL (ref 32–36)
MCV RBC AUTO: 87.2 FL (ref 80–100)
MONOCYTES # BLD AUTO: 332 CELLS/UL (ref 200–950)
MONOCYTES NFR BLD AUTO: 6.5 %
NEUTROPHILS # BLD AUTO: 2795 CELLS/UL (ref 1500–7800)
NEUTROPHILS NFR BLD AUTO: 54.8 %
NONHDLC SERPL-MCNC: 173 MG/DL (CALC)
PLATELET # BLD AUTO: 229 THOUSAND/UL (ref 140–400)
PMV BLD REES-ECKER: 11.3 FL (ref 7.5–12.5)
POTASSIUM SERPL-SCNC: 4.2 MMOL/L (ref 3.5–5.3)
PROT SERPL-MCNC: 6.8 G/DL (ref 6.1–8.1)
RBC # BLD AUTO: 4.7 MILLION/UL (ref 3.8–5.1)
SL AMB EGFR AFRICAN AMERICAN: 94 ML/MIN/1.73M2
SL AMB EGFR NON AFRICAN AMERICAN: 81 ML/MIN/1.73M2
SODIUM SERPL-SCNC: 138 MMOL/L (ref 135–146)
TRIGL SERPL-MCNC: 158 MG/DL
TSH SERPL-ACNC: 1.42 MIU/L (ref 0.4–4.5)
WBC # BLD AUTO: 5.1 THOUSAND/UL (ref 3.8–10.8)

## 2019-05-14 ENCOUNTER — OFFICE VISIT (OUTPATIENT)
Dept: FAMILY MEDICINE CLINIC | Facility: CLINIC | Age: 64
End: 2019-05-14
Payer: MEDICARE

## 2019-05-14 VITALS
DIASTOLIC BLOOD PRESSURE: 70 MMHG | BODY MASS INDEX: 25.47 KG/M2 | OXYGEN SATURATION: 98 % | TEMPERATURE: 97.8 F | RESPIRATION RATE: 12 BRPM | HEIGHT: 64 IN | SYSTOLIC BLOOD PRESSURE: 164 MMHG | HEART RATE: 60 BPM | WEIGHT: 149.2 LBS

## 2019-05-14 DIAGNOSIS — Z00.00 MEDICARE ANNUAL WELLNESS VISIT, INITIAL: ICD-10-CM

## 2019-05-14 DIAGNOSIS — Z11.59 ENCOUNTER FOR HEPATITIS C VIRUS SCREENING TEST FOR HIGH RISK PATIENT: ICD-10-CM

## 2019-05-14 DIAGNOSIS — M85.88 OSTEOPENIA OF SPINE: ICD-10-CM

## 2019-05-14 DIAGNOSIS — Z91.89 ENCOUNTER FOR HEPATITIS C VIRUS SCREENING TEST FOR HIGH RISK PATIENT: ICD-10-CM

## 2019-05-14 DIAGNOSIS — Z13.1 SCREENING FOR DIABETES MELLITUS: ICD-10-CM

## 2019-05-14 DIAGNOSIS — E78.2 MIXED HYPERLIPIDEMIA: ICD-10-CM

## 2019-05-14 DIAGNOSIS — I10 ESSENTIAL HYPERTENSION: Primary | ICD-10-CM

## 2019-05-14 PROCEDURE — G0402 INITIAL PREVENTIVE EXAM: HCPCS | Performed by: FAMILY MEDICINE

## 2019-05-14 PROCEDURE — 99214 OFFICE O/P EST MOD 30 MIN: CPT | Performed by: FAMILY MEDICINE

## 2019-05-14 RX ORDER — AMLODIPINE BESYLATE 5 MG/1
5 TABLET ORAL DAILY
Qty: 30 TABLET | Refills: 6 | Status: SHIPPED | OUTPATIENT
Start: 2019-05-14 | End: 2020-02-11 | Stop reason: SDUPTHER

## 2019-05-14 RX ORDER — PRAVASTATIN SODIUM 20 MG
20 TABLET ORAL
Qty: 30 TABLET | Refills: 5 | Status: SHIPPED | OUTPATIENT
Start: 2019-05-14 | End: 2019-08-13 | Stop reason: ALTCHOICE

## 2019-08-11 PROBLEM — R03.0 ELEVATED BLOOD PRESSURE READING: Status: RESOLVED | Noted: 2018-11-14 | Resolved: 2019-08-11

## 2019-08-12 LAB
ALBUMIN SERPL-MCNC: 4.4 G/DL (ref 3.6–5.1)
ALBUMIN/GLOB SERPL: 1.8 (CALC) (ref 1–2.5)
ALP SERPL-CCNC: 49 U/L (ref 33–130)
ALT SERPL-CCNC: 10 U/L (ref 6–29)
AST SERPL-CCNC: 14 U/L (ref 10–35)
BILIRUB SERPL-MCNC: 0.5 MG/DL (ref 0.2–1.2)
BUN SERPL-MCNC: 14 MG/DL (ref 7–25)
BUN/CREAT SERPL: NORMAL (CALC) (ref 6–22)
CALCIUM SERPL-MCNC: 9.5 MG/DL (ref 8.6–10.4)
CHLORIDE SERPL-SCNC: 104 MMOL/L (ref 98–110)
CHOLEST SERPL-MCNC: 242 MG/DL
CHOLEST/HDLC SERPL: 2.7 (CALC)
CO2 SERPL-SCNC: 29 MMOL/L (ref 20–32)
CREAT SERPL-MCNC: 0.81 MG/DL (ref 0.5–0.99)
GLOBULIN SER CALC-MCNC: 2.5 G/DL (CALC) (ref 1.9–3.7)
GLUCOSE SERPL-MCNC: 92 MG/DL (ref 65–99)
HBA1C MFR BLD: 5.5 % OF TOTAL HGB
HCV AB S/CO SERPL IA: 0.04
HCV AB SERPL QL IA: NORMAL
HDLC SERPL-MCNC: 91 MG/DL
LDLC SERPL CALC-MCNC: 130 MG/DL (CALC)
NONHDLC SERPL-MCNC: 151 MG/DL (CALC)
POTASSIUM SERPL-SCNC: 4 MMOL/L (ref 3.5–5.3)
PROT SERPL-MCNC: 6.9 G/DL (ref 6.1–8.1)
SL AMB EGFR AFRICAN AMERICAN: 88 ML/MIN/1.73M2
SL AMB EGFR NON AFRICAN AMERICAN: 76 ML/MIN/1.73M2
SODIUM SERPL-SCNC: 139 MMOL/L (ref 135–146)
TRIGL SERPL-MCNC: 104 MG/DL

## 2019-08-13 ENCOUNTER — OFFICE VISIT (OUTPATIENT)
Dept: FAMILY MEDICINE CLINIC | Facility: CLINIC | Age: 64
End: 2019-08-13
Payer: MEDICARE

## 2019-08-13 VITALS
TEMPERATURE: 98.5 F | HEIGHT: 64 IN | RESPIRATION RATE: 12 BRPM | HEART RATE: 60 BPM | DIASTOLIC BLOOD PRESSURE: 74 MMHG | WEIGHT: 141 LBS | OXYGEN SATURATION: 97 % | SYSTOLIC BLOOD PRESSURE: 136 MMHG | BODY MASS INDEX: 24.07 KG/M2

## 2019-08-13 DIAGNOSIS — Z12.11 SCREENING FOR COLON CANCER: ICD-10-CM

## 2019-08-13 DIAGNOSIS — Z23 NEED FOR TDAP VACCINATION: ICD-10-CM

## 2019-08-13 DIAGNOSIS — M85.88 OSTEOPENIA OF SPINE: ICD-10-CM

## 2019-08-13 DIAGNOSIS — I10 ESSENTIAL HYPERTENSION: Primary | ICD-10-CM

## 2019-08-13 DIAGNOSIS — E78.2 MIXED HYPERLIPIDEMIA: ICD-10-CM

## 2019-08-13 PROCEDURE — 99214 OFFICE O/P EST MOD 30 MIN: CPT | Performed by: FAMILY MEDICINE

## 2019-08-13 PROCEDURE — 90715 TDAP VACCINE 7 YRS/> IM: CPT

## 2019-08-13 PROCEDURE — 90471 IMMUNIZATION ADMIN: CPT

## 2019-08-13 RX ORDER — PRAVASTATIN SODIUM 40 MG
40 TABLET ORAL DAILY
Qty: 30 TABLET | Refills: 6 | Status: SHIPPED | OUTPATIENT
Start: 2019-08-13 | End: 2020-02-11 | Stop reason: SDUPTHER

## 2019-08-13 NOTE — ASSESSMENT & PLAN NOTE
Lipid panel has improved since 5/19, but still suboptimal control  Will increase dose of Pravastatin from 20 to 40 mg daily  Reviewed a low-cholesterol, low-fat diet with patient and her daughter  Continue regular exercise  Recheck CMP, lipid panel in 3 months

## 2019-08-13 NOTE — ASSESSMENT & PLAN NOTE
Recommended to schedule DEXA scan as ordered at the last visit  Continue weight-bearing exercises, keep sufficient dietary calcium intake, take Vit D 1000 IU daily

## 2019-08-13 NOTE — PROGRESS NOTES
Chief Complaint   Patient presents with    Follow-up     3 month follow up     Health Maintenance   Topic Date Due    MAMMOGRAM  1955    CRC Screening: FOBTx3/FIT  06/10/2003    INFLUENZA VACCINE  10/07/2019 (Originally 7/1/2019)    Pneumococcal Vaccine: 65+ Years (2 of 2 - PPSV23) 11/14/2019    Fall Risk  05/14/2020    Depression Screening PHQ  05/14/2020    Urinary Incontinence Screening  05/14/2020    Medicare Annual Wellness Visit (AWV)  05/14/2020    BMI: Adult  08/13/2020    DTaP,Tdap,and Td Vaccines (2 - Td) 08/13/2029    Hepatitis C Screening  Completed    Pneumococcal Vaccine: Pediatrics (0 to 5 Years) and At-Risk Patients (6 to 59 Years)  Aged Out    HEPATITIS B VACCINES  Aged Out     BMI Counseling: Body mass index is 24 59 kg/m²  Discussed the patient's BMI with her  The BMI is above average  BMI counseling and education was provided to the patient  Nutrition recommendations include 3-5 servings of fruits/vegetables daily, reducing fast food intake, consuming healthier snacks, decreasing soda and/or juice intake, moderation in carbohydrate intake, increasing intake of lean protein, reducing intake of saturated fat and trans fat and reducing intake of cholesterol  Exercise recommendations include moderate aerobic physical activity for 150 minutes/week  Assessment/Plan:    Essential hypertension  BP improved since last visit in May 2019  Continue Amlodipine 5 mg daily  Follow a low-sodium diet  Mixed hyperlipidemia  Lipid panel has improved since 5/19, but still suboptimal control  Will increase dose of Pravastatin from 20 to 40 mg daily  Reviewed a low-cholesterol, low-fat diet with patient and her daughter  Continue regular exercise  Recheck CMP, lipid panel in 3 months  Osteopenia  Recommended to schedule DEXA scan as ordered at the last visit  Continue weight-bearing exercises, keep sufficient dietary calcium intake, take Vit D 1000 IU daily       HM: recommended to schedule screening mammogram   Patient is reluctant to schedule colonoscopy  Will order FIT testing  Tdap administered today  I have spent 25 minutes with Patient and family today in which greater than 50% of this time was spent in counseling/coordination of care regarding Diagnostic results, Risks and benefits of tx options, Intructions for management, Patient and family education, Importance of tx compliance, Risk factor reductions and Impressions  Schedule follow-up office visit in 6 months  Diagnoses and all orders for this visit:    Essential hypertension  -     Comprehensive metabolic panel; Future    Mixed hyperlipidemia  -     Comprehensive metabolic panel; Future  -     Lipid panel; Future  -     pravastatin (PRAVACHOL) 40 mg tablet; Take 1 tablet (40 mg total) by mouth daily    Osteopenia of spine    Screening for colon cancer  -     Occult Blood, Fecal Immunochemical; Future    Need for Tdap vaccination  -     TDAP VACCINE GREATER THAN OR EQUAL TO 6YO IM          Subjective:      Patient ID: Marixa Castaneda is a 75 y o  female  HPI     Patient presents accompanied by her daughter for 3 month follow-up visit for Hypertension, Hyperlipidemia, Osteopenia  Reviewed current medications, blood test results from August 9, 2019  Hb  A1c 5 5, cholesterol 242 ( previously 260 in 5/19),  HDL 91, , triglycerides 104  Potassium 4 0, fasting blood sugar 92, creatinine 0 81, LFTs - within normal range  HTN - BP improved since last visit after starting on Amlodipine 5 mg daily  Denies chest pain, shortness of breath, dizziness  She walks 2 hours daily  Hyperlipidemia - improved since May 2019  Patient was started on Pravastatin 20 mg daily at the last visit  Denies side effects from statin therapy  Patient lost 8 lb since 5/19  She tries is to which her diet more closely, cut down on starches, sweets      Patient had DEXA scan done in May 2016 which showed osteopenia  She takes Vit D 1000 IU daily  Denies tobacco use  No history of falls or fractures  She did not schedule mammogram, colonoscopy, DEXA scan as ordered at the last visit  Prevnar 13 done in 11/18  The following portions of the patient's history were reviewed and updated as appropriate: allergies, current medications, past family history, past social history, past surgical history and problem list     Review of Systems   Constitutional: Negative for activity change, appetite change, chills, fatigue and fever  HENT: Negative for congestion, ear pain, hearing loss, mouth sores, nosebleeds, sore throat, tinnitus and trouble swallowing  Eyes: Negative for pain, discharge, redness, itching and visual disturbance  Respiratory: Negative for cough, chest tightness, shortness of breath and wheezing  Cardiovascular: Negative for chest pain, palpitations and leg swelling  Gastrointestinal: Negative for abdominal pain, blood in stool, constipation, diarrhea, nausea and vomiting  Genitourinary: Negative for difficulty urinating, dysuria, flank pain, frequency, hematuria and pelvic pain  Musculoskeletal: Negative for arthralgias, back pain, gait problem, joint swelling, myalgias and neck pain  Skin: Negative for rash and wound  Neurological: Negative for dizziness, syncope, numbness and headaches  Hematological: Negative  Psychiatric/Behavioral: Negative  Objective:      /74 (BP Location: Left arm, Patient Position: Sitting, Cuff Size: Adult)   Pulse 60   Temp 98 5 °F (36 9 °C) (Tympanic)   Resp 12   Ht 5' 3 5" (1 613 m)   Wt 64 kg (141 lb)   SpO2 97%   BMI 24 59 kg/m²     BP on retake 140/82  Physical Exam   Constitutional: She appears well-developed and well-nourished  HENT:   Head: Normocephalic and atraumatic     Right Ear: External ear normal    Left Ear: External ear normal    Mouth/Throat: Oropharynx is clear and moist  Eyes: Pupils are equal, round, and reactive to light  Conjunctivae are normal    Neck: Normal range of motion  Neck supple  No JVD present  Cardiovascular: Normal rate, regular rhythm and normal heart sounds  No murmur heard  No BL LE edema  No carotid bruits BL   Pulmonary/Chest: Effort normal and breath sounds normal    Abdominal: Soft  Bowel sounds are normal  There is no tenderness  Musculoskeletal: Normal range of motion  She exhibits no edema, tenderness or deformity  Skin: Skin is warm and dry  No rash noted  Psychiatric: She has a normal mood and affect  Nursing note and vitals reviewed

## 2019-08-26 ENCOUNTER — CLINICAL SUPPORT (OUTPATIENT)
Dept: FAMILY MEDICINE CLINIC | Facility: CLINIC | Age: 64
End: 2019-08-26
Payer: MEDICARE

## 2019-08-26 DIAGNOSIS — Z23 NEED FOR ZOSTER VACCINATION: Primary | ICD-10-CM

## 2019-08-26 PROCEDURE — 90471 IMMUNIZATION ADMIN: CPT

## 2019-08-26 PROCEDURE — 90750 HZV VACC RECOMBINANT IM: CPT

## 2020-02-07 LAB
ALBUMIN SERPL-MCNC: 4.2 G/DL (ref 3.6–5.1)
ALBUMIN/GLOB SERPL: 1.8 (CALC) (ref 1–2.5)
ALP SERPL-CCNC: 54 U/L (ref 37–153)
ALT SERPL-CCNC: 10 U/L (ref 6–29)
AST SERPL-CCNC: 14 U/L (ref 10–35)
BILIRUB SERPL-MCNC: 0.6 MG/DL (ref 0.2–1.2)
BUN SERPL-MCNC: 17 MG/DL (ref 7–25)
BUN/CREAT SERPL: NORMAL (CALC) (ref 6–22)
CALCIUM SERPL-MCNC: 9.2 MG/DL (ref 8.6–10.4)
CHLORIDE SERPL-SCNC: 101 MMOL/L (ref 98–110)
CHOLEST SERPL-MCNC: 215 MG/DL
CHOLEST/HDLC SERPL: 2.3 (CALC)
CO2 SERPL-SCNC: 31 MMOL/L (ref 20–32)
CREAT SERPL-MCNC: 0.76 MG/DL (ref 0.5–0.99)
GLOBULIN SER CALC-MCNC: 2.4 G/DL (CALC) (ref 1.9–3.7)
GLUCOSE SERPL-MCNC: 90 MG/DL (ref 65–99)
HDLC SERPL-MCNC: 94 MG/DL
LDLC SERPL CALC-MCNC: 99 MG/DL (CALC)
NONHDLC SERPL-MCNC: 121 MG/DL (CALC)
POTASSIUM SERPL-SCNC: 4.3 MMOL/L (ref 3.5–5.3)
PROT SERPL-MCNC: 6.6 G/DL (ref 6.1–8.1)
SL AMB EGFR AFRICAN AMERICAN: 95 ML/MIN/1.73M2
SL AMB EGFR NON AFRICAN AMERICAN: 82 ML/MIN/1.73M2
SODIUM SERPL-SCNC: 138 MMOL/L (ref 135–146)
TRIGL SERPL-MCNC: 122 MG/DL

## 2020-02-11 ENCOUNTER — OFFICE VISIT (OUTPATIENT)
Dept: FAMILY MEDICINE CLINIC | Facility: CLINIC | Age: 65
End: 2020-02-11
Payer: MEDICARE

## 2020-02-11 VITALS
RESPIRATION RATE: 16 BRPM | OXYGEN SATURATION: 97 % | BODY MASS INDEX: 23.87 KG/M2 | HEIGHT: 64 IN | TEMPERATURE: 98.2 F | DIASTOLIC BLOOD PRESSURE: 78 MMHG | HEART RATE: 60 BPM | SYSTOLIC BLOOD PRESSURE: 140 MMHG | WEIGHT: 139.8 LBS

## 2020-02-11 DIAGNOSIS — Z23 ENCOUNTER FOR ADMINISTRATION OF VACCINE: ICD-10-CM

## 2020-02-11 DIAGNOSIS — I10 ESSENTIAL HYPERTENSION: ICD-10-CM

## 2020-02-11 DIAGNOSIS — E78.2 MIXED HYPERLIPIDEMIA: ICD-10-CM

## 2020-02-11 DIAGNOSIS — Z12.39 SCREENING FOR BREAST CANCER: ICD-10-CM

## 2020-02-11 DIAGNOSIS — M85.88 OSTEOPENIA OF SPINE: ICD-10-CM

## 2020-02-11 PROCEDURE — 3077F SYST BP >= 140 MM HG: CPT | Performed by: FAMILY MEDICINE

## 2020-02-11 PROCEDURE — 3078F DIAST BP <80 MM HG: CPT | Performed by: FAMILY MEDICINE

## 2020-02-11 PROCEDURE — 1036F TOBACCO NON-USER: CPT | Performed by: FAMILY MEDICINE

## 2020-02-11 PROCEDURE — 90750 HZV VACC RECOMBINANT IM: CPT

## 2020-02-11 PROCEDURE — 4040F PNEUMOC VAC/ADMIN/RCVD: CPT | Performed by: FAMILY MEDICINE

## 2020-02-11 PROCEDURE — 99214 OFFICE O/P EST MOD 30 MIN: CPT | Performed by: FAMILY MEDICINE

## 2020-02-11 PROCEDURE — 3008F BODY MASS INDEX DOCD: CPT | Performed by: FAMILY MEDICINE

## 2020-02-11 PROCEDURE — 90471 IMMUNIZATION ADMIN: CPT

## 2020-02-11 PROCEDURE — 1160F RVW MEDS BY RX/DR IN RCRD: CPT | Performed by: FAMILY MEDICINE

## 2020-02-11 RX ORDER — PRAVASTATIN SODIUM 40 MG
40 TABLET ORAL DAILY
Qty: 90 TABLET | Refills: 3 | Status: SHIPPED | OUTPATIENT
Start: 2020-02-11

## 2020-02-11 RX ORDER — AMLODIPINE BESYLATE 5 MG/1
5 TABLET ORAL DAILY
Qty: 90 TABLET | Refills: 3 | Status: SHIPPED | OUTPATIENT
Start: 2020-02-11 | End: 2020-02-11 | Stop reason: SDUPTHER

## 2020-02-11 RX ORDER — AMLODIPINE BESYLATE 5 MG/1
5 TABLET ORAL DAILY
Qty: 90 TABLET | Refills: 3 | Status: SHIPPED | OUTPATIENT
Start: 2020-02-11

## 2020-02-11 NOTE — ASSESSMENT & PLAN NOTE
Schedule DEXA scan  Continue weight-bearing exercise, keep sufficient dietary calcium intake, take  Vit D 1000 IU  Daily

## 2020-02-11 NOTE — ASSESSMENT & PLAN NOTE
Lipid panel has improved since August 2019  Continue Pravastatin 40 mg daily  Recheck CMP, lipid panel in 6 months

## 2020-02-11 NOTE — PROGRESS NOTES
Assessment/Plan:    Essential hypertension  Blood pressure is stable  Continue Amlodipine 5 mg daily  Follow a low sodium diet, regular exercise  Mixed hyperlipidemia  Lipid panel has improved since August 2019  Continue Pravastatin 40 mg daily  Recheck CMP, lipid panel in 6 months  Osteopenia  Schedule DEXA scan  Continue weight-bearing exercise, keep sufficient dietary calcium intake, take  Vit D 1000 IU  Daily  HM: discussed with patient and her daughter scheduling screening mammogram     Patient refusing colonoscopy  Recommended to check FIT as ordered at ricardo last visit in 8/19  Shingrix # 2 administered today  I have spent 25 minutes with Patient  today in which greater than 50% of this time was spent in counseling/coordination of care regarding Diagnostic results, Risks and benefits of tx options, Intructions for management, Patient and family education, Importance of tx compliance, Risk factor reductions and Impressions  Schedule follow-up office visit in 6 months  Check labs prior to next visit  Diagnoses and all orders for this visit:    Essential hypertension  -     Comprehensive metabolic panel; Future  -     Discontinue: amLODIPine (NORVASC) 5 mg tablet; Take 1 tablet (5 mg total) by mouth daily  -     amLODIPine (NORVASC) 5 mg tablet; Take 1 tablet (5 mg total) by mouth daily    Mixed hyperlipidemia  -     Comprehensive metabolic panel; Future  -     Lipid panel; Future  -     pravastatin (PRAVACHOL) 40 mg tablet; Take 1 tablet (40 mg total) by mouth daily    Osteopenia of spine  -     DXA bone density spine hip and pelvis; Future    Screening for breast cancer  -     Mammo screening bilateral w cad; Future          Subjective:      Patient ID: Stacy Ferguson is a 75 y o  female  HPI     Patient is 49-year-old female with past medical history of HTN, Hyperlipidemia, Osteopenia        She presents for 6 month follow-up office visit accompanied by her daughter  Reviewed current medications, blood test results from 2/7/20  Fasting blood sugar 90, creatinine 0 76, potassium 4 3, LFTs -within range  Lipid panel has improved after dose of Pravastatin was increased to 40 mg daily in 8/19  Cholesterol 215 ( previously 242), HDL 94, LDL 99 ( was 130)  HTN - patient takes Amlodipine 5 mg daily  Denies chest pain, dizziness, shortness of breath  Walks daily  Hyperlipidemia - improved  Denies side effects from statin therapy  DEXA scan done in May 2016 showed osteopenia  Patient did not schedule DEXA scan and mammogram as ordered at the last visit  Currently taking Vit  IU daily  Denies tobacco use  No history of falls or fractures  Prevnar 13 done in November 2018  Patient declined flu vaccination today  The following portions of the patient's history were reviewed and updated as appropriate: allergies, past family history, past medical history, past social history, past surgical history and problem list     Review of Systems   Constitutional: Negative for activity change, appetite change, chills, fatigue and fever  HENT: Negative for congestion, dental problem, ear pain, mouth sores, nosebleeds, sore throat, tinnitus and trouble swallowing  Eyes: Negative for pain, discharge, redness, itching and visual disturbance  Respiratory: Negative for cough, chest tightness, shortness of breath and wheezing  Cardiovascular: Negative for chest pain, palpitations and leg swelling  Gastrointestinal: Negative for abdominal pain, blood in stool, constipation, diarrhea, nausea and vomiting  Genitourinary: Negative for difficulty urinating, dysuria, flank pain, frequency and hematuria  Musculoskeletal: Negative for arthralgias, back pain, gait problem, joint swelling, myalgias and neck pain  Skin: Negative for rash and wound  Neurological: Negative for dizziness and headaches  Hematological: Negative  Psychiatric/Behavioral: Negative  Objective:      /78 (BP Location: Right arm, Patient Position: Sitting, Cuff Size: Standard)   Pulse 60   Temp 98 2 °F (36 8 °C) (Tympanic)   Resp 16   Ht 5' 3 5" (1 613 m)   Wt 63 4 kg (139 lb 12 8 oz)   SpO2 97%   BMI 24 38 kg/m²          Physical Exam   Constitutional: She appears well-developed and well-nourished  HENT:   Head: Normocephalic and atraumatic  Right Ear: External ear normal    Left Ear: External ear normal    Mouth/Throat: Oropharynx is clear and moist    Eyes: Pupils are equal, round, and reactive to light  Conjunctivae are normal    Neck: Normal range of motion  No JVD present  Cardiovascular: Normal rate, regular rhythm and normal heart sounds  No murmur heard  No carotid bruits BL  No BL LE edema   Pulmonary/Chest: Effort normal and breath sounds normal    Abdominal: Soft  Bowel sounds are normal  There is no tenderness  Musculoskeletal: Normal range of motion  She exhibits no edema, tenderness or deformity  Skin: Skin is warm and dry  No rash noted  Psychiatric: She has a normal mood and affect  Nursing note and vitals reviewed

## 2020-02-11 NOTE — ASSESSMENT & PLAN NOTE
Blood pressure is stable  Continue Amlodipine 5 mg daily  Follow a low sodium diet, regular exercise

## 2021-05-22 ENCOUNTER — IMMUNIZATIONS (OUTPATIENT)
Dept: FAMILY MEDICINE CLINIC | Facility: HOSPITAL | Age: 66
End: 2021-05-22

## 2021-05-22 DIAGNOSIS — Z23 ENCOUNTER FOR IMMUNIZATION: Primary | ICD-10-CM

## 2021-05-22 PROCEDURE — 0001A SARS-COV-2 / COVID-19 MRNA VACCINE (PFIZER-BIONTECH) 30 MCG: CPT

## 2021-05-22 PROCEDURE — 91300 SARS-COV-2 / COVID-19 MRNA VACCINE (PFIZER-BIONTECH) 30 MCG: CPT

## 2021-06-12 ENCOUNTER — IMMUNIZATIONS (OUTPATIENT)
Dept: FAMILY MEDICINE CLINIC | Facility: HOSPITAL | Age: 66
End: 2021-06-12

## 2021-06-12 DIAGNOSIS — Z23 ENCOUNTER FOR IMMUNIZATION: Primary | ICD-10-CM

## 2021-06-12 PROCEDURE — 0002A SARS-COV-2 / COVID-19 MRNA VACCINE (PFIZER-BIONTECH) 30 MCG: CPT

## 2021-06-12 PROCEDURE — 91300 SARS-COV-2 / COVID-19 MRNA VACCINE (PFIZER-BIONTECH) 30 MCG: CPT

## 2021-06-17 ENCOUNTER — TELEPHONE (OUTPATIENT)
Dept: FAMILY MEDICINE CLINIC | Facility: CLINIC | Age: 66
End: 2021-06-17

## 2021-06-17 NOTE — TELEPHONE ENCOUNTER
I left patient a voice message and mailed a postcard to patient asking her to contact the office to schedule an overdue Medicare Annual Wellness Visit

## 2022-01-22 ENCOUNTER — IMMUNIZATIONS (OUTPATIENT)
Dept: FAMILY MEDICINE CLINIC | Facility: HOSPITAL | Age: 67
End: 2022-01-22

## 2022-01-22 DIAGNOSIS — Z23 ENCOUNTER FOR IMMUNIZATION: Primary | ICD-10-CM

## 2022-01-22 PROCEDURE — 0001A COVID-19 PFIZER VACC 0.3 ML: CPT

## 2022-01-22 PROCEDURE — 91300 COVID-19 PFIZER VACC 0.3 ML: CPT

## 2024-01-31 ENCOUNTER — TELEPHONE (OUTPATIENT)
Dept: FAMILY MEDICINE CLINIC | Facility: CLINIC | Age: 69
End: 2024-01-31

## 2024-01-31 NOTE — LETTER
Preeti Ness  [address] [facility] [address]      1/31/2024      Dear Preeti,      Records from Insurance indicate that you are listed as a patient of Christal Hernandez MD with [facility], [facility].     However, it has now been over 3 years since your last appointment on 2/10/2020.      Please contact our office at [phone] to ensure that you remain established with Christal Hernandez MD by scheduling your Annual Visit.    If you have established with a primary care physician other than the one listed above, please let our office know so that we can update our records. We also suggest calling the number on the back of your insurance card to update this information with your insurance company.    We thank you for choosing [facility] for your healthcare needs.      Sincerely,    [facility]

## 2024-02-21 PROBLEM — Z00.00 MEDICARE ANNUAL WELLNESS VISIT, INITIAL: Status: RESOLVED | Noted: 2019-05-12 | Resolved: 2024-02-21

## 2024-03-01 NOTE — TELEPHONE ENCOUNTER
02/29/24 10:41 PM        The office's request has been received, reviewed, and the patient chart updated. The PCP has successfully been removed with a patient attribution note. This message will now be completed.        Thank you  Enedelia Cai